# Patient Record
Sex: FEMALE | Race: WHITE | URBAN - METROPOLITAN AREA
[De-identification: names, ages, dates, MRNs, and addresses within clinical notes are randomized per-mention and may not be internally consistent; named-entity substitution may affect disease eponyms.]

---

## 2018-03-20 ENCOUNTER — TELEPHONE (OUTPATIENT)
Dept: INTERNAL MEDICINE CLINIC | Facility: CLINIC | Age: 43
End: 2018-03-20

## 2018-05-06 ENCOUNTER — CHARTING TRANS (OUTPATIENT)
Dept: OTHER | Age: 43
End: 2018-05-06

## 2018-05-06 ENCOUNTER — LAB SERVICES (OUTPATIENT)
Dept: OTHER | Age: 43
End: 2018-05-06

## 2018-05-10 LAB — RAPID STREP GROUP A: POSITIVE

## 2018-08-10 ENCOUNTER — APPOINTMENT (OUTPATIENT)
Dept: OTHER | Facility: HOSPITAL | Age: 43
End: 2018-08-10
Attending: PREVENTIVE MEDICINE

## 2018-08-13 ENCOUNTER — APPOINTMENT (OUTPATIENT)
Dept: OTHER | Facility: HOSPITAL | Age: 43
End: 2018-08-13
Attending: PREVENTIVE MEDICINE

## 2018-11-01 VITALS
SYSTOLIC BLOOD PRESSURE: 102 MMHG | BODY MASS INDEX: 25.69 KG/M2 | TEMPERATURE: 97.5 F | HEIGHT: 63 IN | WEIGHT: 145 LBS | DIASTOLIC BLOOD PRESSURE: 80 MMHG

## 2023-03-22 RX ORDER — GARLIC EXTRACT 500 MG
1 CAPSULE ORAL DAILY
COMMUNITY

## 2023-03-26 ENCOUNTER — LAB ENCOUNTER (OUTPATIENT)
Dept: LAB | Facility: HOSPITAL | Age: 48
End: 2023-03-26
Attending: OBSTETRICS & GYNECOLOGY
Payer: COMMERCIAL

## 2023-03-26 DIAGNOSIS — Z01.818 PRE-OP TESTING: ICD-10-CM

## 2023-03-26 LAB
ERYTHROCYTE [DISTWIDTH] IN BLOOD BY AUTOMATED COUNT: 14.3 %
HCT VFR BLD AUTO: 39.7 %
HGB BLD-MCNC: 12.8 G/DL
MCH RBC QN AUTO: 28.5 PG (ref 26–34)
MCHC RBC AUTO-ENTMCNC: 32.2 G/DL (ref 31–37)
MCV RBC AUTO: 88.4 FL
PLATELET # BLD AUTO: 199 10(3)UL (ref 150–450)
RBC # BLD AUTO: 4.49 X10(6)UL
WBC # BLD AUTO: 4.7 X10(3) UL (ref 4–11)

## 2023-03-26 PROCEDURE — 36415 COLL VENOUS BLD VENIPUNCTURE: CPT

## 2023-03-26 PROCEDURE — 85027 COMPLETE CBC AUTOMATED: CPT

## 2023-03-28 ENCOUNTER — ANESTHESIA (OUTPATIENT)
Dept: SURGERY | Facility: HOSPITAL | Age: 48
End: 2023-03-28
Payer: COMMERCIAL

## 2023-03-28 ENCOUNTER — HOSPITAL ENCOUNTER (OUTPATIENT)
Facility: HOSPITAL | Age: 48
Setting detail: HOSPITAL OUTPATIENT SURGERY
Discharge: HOME | End: 2023-03-28
Attending: OBSTETRICS & GYNECOLOGY | Admitting: OBSTETRICS & GYNECOLOGY
Payer: COMMERCIAL

## 2023-03-28 ENCOUNTER — ANESTHESIA EVENT (OUTPATIENT)
Dept: SURGERY | Facility: HOSPITAL | Age: 48
End: 2023-03-28
Payer: COMMERCIAL

## 2023-03-28 VITALS
HEIGHT: 63 IN | WEIGHT: 159.19 LBS | RESPIRATION RATE: 18 BRPM | BODY MASS INDEX: 28.21 KG/M2 | TEMPERATURE: 99 F | SYSTOLIC BLOOD PRESSURE: 118 MMHG | HEART RATE: 57 BPM | OXYGEN SATURATION: 97 % | DIASTOLIC BLOOD PRESSURE: 72 MMHG

## 2023-03-28 DIAGNOSIS — Z01.818 PRE-OP TESTING: Primary | ICD-10-CM

## 2023-03-28 LAB — B-HCG UR QL: NEGATIVE

## 2023-03-28 PROCEDURE — 0UDB8ZX EXTRACTION OF ENDOMETRIUM, VIA NATURAL OR ARTIFICIAL OPENING ENDOSCOPIC, DIAGNOSTIC: ICD-10-PCS | Performed by: OBSTETRICS & GYNECOLOGY

## 2023-03-28 PROCEDURE — 0U5B8ZZ DESTRUCTION OF ENDOMETRIUM, VIA NATURAL OR ARTIFICIAL OPENING ENDOSCOPIC: ICD-10-PCS | Performed by: OBSTETRICS & GYNECOLOGY

## 2023-03-28 PROCEDURE — 88305 TISSUE EXAM BY PATHOLOGIST: CPT | Performed by: OBSTETRICS & GYNECOLOGY

## 2023-03-28 PROCEDURE — 81025 URINE PREGNANCY TEST: CPT

## 2023-03-28 RX ORDER — HYDROMORPHONE HYDROCHLORIDE 1 MG/ML
0.2 INJECTION, SOLUTION INTRAMUSCULAR; INTRAVENOUS; SUBCUTANEOUS EVERY 5 MIN PRN
Status: DISCONTINUED | OUTPATIENT
Start: 2023-03-28 | End: 2023-03-28

## 2023-03-28 RX ORDER — SODIUM CHLORIDE, SODIUM LACTATE, POTASSIUM CHLORIDE, CALCIUM CHLORIDE 600; 310; 30; 20 MG/100ML; MG/100ML; MG/100ML; MG/100ML
INJECTION, SOLUTION INTRAVENOUS CONTINUOUS
Status: DISCONTINUED | OUTPATIENT
Start: 2023-03-28 | End: 2023-03-28

## 2023-03-28 RX ORDER — LIDOCAINE HYDROCHLORIDE 10 MG/ML
INJECTION, SOLUTION EPIDURAL; INFILTRATION; INTRACAUDAL; PERINEURAL AS NEEDED
Status: DISCONTINUED | OUTPATIENT
Start: 2023-03-28 | End: 2023-03-28 | Stop reason: SURG

## 2023-03-28 RX ORDER — KETOROLAC TROMETHAMINE 30 MG/ML
INJECTION, SOLUTION INTRAMUSCULAR; INTRAVENOUS AS NEEDED
Status: DISCONTINUED | OUTPATIENT
Start: 2023-03-28 | End: 2023-03-28 | Stop reason: SURG

## 2023-03-28 RX ORDER — HYDROMORPHONE HYDROCHLORIDE 1 MG/ML
0.6 INJECTION, SOLUTION INTRAMUSCULAR; INTRAVENOUS; SUBCUTANEOUS EVERY 5 MIN PRN
Status: DISCONTINUED | OUTPATIENT
Start: 2023-03-28 | End: 2023-03-28

## 2023-03-28 RX ORDER — MIDAZOLAM HYDROCHLORIDE 1 MG/ML
INJECTION INTRAMUSCULAR; INTRAVENOUS AS NEEDED
Status: DISCONTINUED | OUTPATIENT
Start: 2023-03-28 | End: 2023-03-28 | Stop reason: SURG

## 2023-03-28 RX ORDER — ACETAMINOPHEN 500 MG
1000 TABLET ORAL ONCE
Status: DISCONTINUED | OUTPATIENT
Start: 2023-03-28 | End: 2023-03-28 | Stop reason: HOSPADM

## 2023-03-28 RX ORDER — DEXAMETHASONE SODIUM PHOSPHATE 4 MG/ML
VIAL (ML) INJECTION AS NEEDED
Status: DISCONTINUED | OUTPATIENT
Start: 2023-03-28 | End: 2023-03-28 | Stop reason: SURG

## 2023-03-28 RX ORDER — HYDROMORPHONE HYDROCHLORIDE 1 MG/ML
0.4 INJECTION, SOLUTION INTRAMUSCULAR; INTRAVENOUS; SUBCUTANEOUS EVERY 5 MIN PRN
Status: DISCONTINUED | OUTPATIENT
Start: 2023-03-28 | End: 2023-03-28

## 2023-03-28 RX ORDER — HYDROCODONE BITARTRATE AND ACETAMINOPHEN 5; 325 MG/1; MG/1
1 TABLET ORAL ONCE AS NEEDED
Status: DISCONTINUED | OUTPATIENT
Start: 2023-03-28 | End: 2023-03-28

## 2023-03-28 RX ORDER — ACETAMINOPHEN 500 MG
1000 TABLET ORAL ONCE AS NEEDED
Status: DISCONTINUED | OUTPATIENT
Start: 2023-03-28 | End: 2023-03-28

## 2023-03-28 RX ORDER — HYDROCODONE BITARTRATE AND ACETAMINOPHEN 5; 325 MG/1; MG/1
2 TABLET ORAL ONCE AS NEEDED
Status: DISCONTINUED | OUTPATIENT
Start: 2023-03-28 | End: 2023-03-28

## 2023-03-28 RX ORDER — ONDANSETRON 2 MG/ML
4 INJECTION INTRAMUSCULAR; INTRAVENOUS EVERY 6 HOURS PRN
Status: DISCONTINUED | OUTPATIENT
Start: 2023-03-28 | End: 2023-03-28

## 2023-03-28 RX ORDER — KETAMINE HYDROCHLORIDE 50 MG/ML
INJECTION, SOLUTION, CONCENTRATE INTRAMUSCULAR; INTRAVENOUS AS NEEDED
Status: DISCONTINUED | OUTPATIENT
Start: 2023-03-28 | End: 2023-03-28 | Stop reason: SURG

## 2023-03-28 RX ORDER — ONDANSETRON 2 MG/ML
INJECTION INTRAMUSCULAR; INTRAVENOUS AS NEEDED
Status: DISCONTINUED | OUTPATIENT
Start: 2023-03-28 | End: 2023-03-28 | Stop reason: SURG

## 2023-03-28 RX ORDER — FLUCONAZOLE 150 MG/1
TABLET ORAL
COMMUNITY
Start: 2023-02-20

## 2023-03-28 RX ORDER — NALOXONE HYDROCHLORIDE 0.4 MG/ML
80 INJECTION, SOLUTION INTRAMUSCULAR; INTRAVENOUS; SUBCUTANEOUS AS NEEDED
Status: DISCONTINUED | OUTPATIENT
Start: 2023-03-28 | End: 2023-03-28

## 2023-03-28 RX ORDER — PROCHLORPERAZINE EDISYLATE 5 MG/ML
5 INJECTION INTRAMUSCULAR; INTRAVENOUS EVERY 8 HOURS PRN
Status: DISCONTINUED | OUTPATIENT
Start: 2023-03-28 | End: 2023-03-28

## 2023-03-28 RX ORDER — SCOLOPAMINE TRANSDERMAL SYSTEM 1 MG/1
1 PATCH, EXTENDED RELEASE TRANSDERMAL ONCE
Status: DISCONTINUED | OUTPATIENT
Start: 2023-03-28 | End: 2023-03-28 | Stop reason: HOSPADM

## 2023-03-28 RX ADMIN — DEXAMETHASONE SODIUM PHOSPHATE 4 MG: 4 MG/ML VIAL (ML) INJECTION at 17:35:00

## 2023-03-28 RX ADMIN — ONDANSETRON 4 MG: 2 INJECTION INTRAMUSCULAR; INTRAVENOUS at 17:44:00

## 2023-03-28 RX ADMIN — LIDOCAINE HYDROCHLORIDE 50 MG: 10 INJECTION, SOLUTION EPIDURAL; INFILTRATION; INTRACAUDAL; PERINEURAL at 17:14:00

## 2023-03-28 RX ADMIN — KETOROLAC TROMETHAMINE 30 MG: 30 INJECTION, SOLUTION INTRAMUSCULAR; INTRAVENOUS at 17:42:00

## 2023-03-28 RX ADMIN — SODIUM CHLORIDE, SODIUM LACTATE, POTASSIUM CHLORIDE, CALCIUM CHLORIDE: 600; 310; 30; 20 INJECTION, SOLUTION INTRAVENOUS at 17:09:00

## 2023-03-28 RX ADMIN — MIDAZOLAM HYDROCHLORIDE 2 MG: 1 INJECTION INTRAMUSCULAR; INTRAVENOUS at 17:11:00

## 2023-03-28 RX ADMIN — KETAMINE HYDROCHLORIDE 12.5 MG: 50 INJECTION, SOLUTION, CONCENTRATE INTRAMUSCULAR; INTRAVENOUS at 17:15:00

## 2023-03-28 NOTE — DISCHARGE INSTRUCTIONS
Home Care Instructions  DILATATION AND CURETTAGE (D&C)  Dr Chaya Sifuentes. Dr Lionel Soto    Definition  Dilation of the cervix (the opening of the uterus) and scraping of the endometrial lining of the uterus for diagnostic and/or therapeutic purposes. IMPORTANT POINTS TO KNOW   1. Minimal activity is preferred for two or three days (No heavy lifting,   straining or strenuous activity. 2. Showering is preferred to bathing for three to four days. 3. It is important to wipe from the front to the back after elimination. 4. No sexual intercourse, douching, or use of tampons for 14 days   5. Vaginal bleeding or spotting may continue for a week after the   procedure   6. Mild cramping may continue for two or three days after the procedure. A   mild analgesic, like Aleve or Advil, should relieve the cramping. NOTIFY THE OFFICE OF   1. Vaginal bleeding heavier than a menstrual period   2. Vaginal discharge that burns, irritate or has a foul odor   3. Chills or temperature of 100.8 degrees of higher   4. Severe lower abdominal cramps or pain   5. Frequency, urgency, and/or burning with urination. The above listed information is meant to be a guide only, if you have problems or questions not answered on this sheet, please contact our office. [facility]  [phone] received a drug called Toradol which is an Anti Inflammatory at: 5:42 PM  If you are allowed to take Anti inflammatories:    Do not take any Anti Inflammatory like Motrin, Aleve or Ibuprophen until after: 11:42 PM if needed.  Always follow all label directions   Please report any suspected allergic reactions or bleeding issues to your doctor

## 2023-03-28 NOTE — BRIEF OP NOTE
Pre-Operative Diagnosis: MENORRHAGIA     Post-Operative Diagnosis: MENORRHAGIA      Procedure Performed:   HYSTEROSCOPY, DILATION AND CURETTAGE WITH KENDALL ENDOMETRIAL ABLATION    Surgeon(s) and Role:     * Clif Medina MD - Primary     Surgical Findings: 4cm cervix, 10cm sound - Cavity at 6cm.      Specimen: endometrial curettings     Estimated Blood Loss: 50cc    Moi Nam MD  3/28/2023  5:52 PM

## 2023-03-29 NOTE — OPERATIVE REPORT
[facility]    PATIENT'S NAME: Tamar Ruiz   ATTENDING PHYSICIAN: Kamari Mcclelland M.D. OPERATING PHYSICIAN: Kamari Mcclelland M.D. PATIENT ACCOUNT#:   [de-identified]    LOCATION:  [facility] PRE [facility] 14 EDW 10  MEDICAL RECORD #:   [id number]       YOB: 1975  ADMISSION DATE:       03/28/2023      OPERATION DATE:  03/28/2023    OPERATIVE REPORT      PREOPERATIVE DIAGNOSIS:  Menorrhagia. POSTOPERATIVE DIAGNOSIS:  Menorrhagia. PROCEDURE:  Hysteroscopy, dilatation and curettage, Carolann endometrial ablation. ANESTHESIA:  MAC.    COMPLICATIONS:  None. ESTIMATED BLOOD LOSS:  50 mL. FLUIDS:  800 mL of LR with 200 mL deficit. URINE OUTPUT:  300 mL of clear urine prior to the procedure. OPERATIVE TECHNIQUE:  The patient was taken to the operating with IV fluids running. She was then placed under anesthesia without difficulty, placed in the dorsal lithotomy position, and prepped and draped in the usual sterile fashion. At this time, a surgical time-out was performed confirming patient and procedure. Next, the red rubber catheter was used to empty the bladder of approximately 300 mL of urine. Exam under anesthesia revealed an anteverted, slightly enlarged uterus. A weighted speculum was next placed, and the anterior lip of the cervix was grasped with a single-tooth tenaculum. The cervix was gently dilated to 5 mm, and the hysteroscope was introduced with excellent visualization of the endometrial cavity. Both ostia were visualized. There were no polyps or fibroids noted. There was just a thickened endometrium. At this time, the hysteroscope was removed, and the cervix was then dilated to 8 mm. Following this, the [place] Baars was activated. It was placed up to the endometrial cavity into the fundus, noted with a cervical length of 4 cm, uterine sound of 10 cm giving a cavity length of 6 cm. The Carolann device was placed to these settings.   Once it was placed inside the cavity, the balloon was expanded, and the paddle was pushed for the cavity integrity assessment. This was passed x2, and the Carolann device was activated. At 120 seconds of burn time, the procedure ended. The Carolann device was removed gently after bringing the array back in and taking out the air in the balloon. The device was gently removed from the patient's uterus through the cervix. Following this, the hysteroscope was reintroduced to evaluate the endometrial ablation. However, the patient did not tolerate this, and no pictures were taken. Following that, the tenaculum was removed. There was minimal bleeding noted at the tenaculum site. The patient was then awakened from anesthesia and taken to Recovery in stable condition. Endometrial curettings were the pathology.      Dictated By Marco A Sheridan M.D.  d: 03/28/2023 18:05:14  t: 03/28/2023 20:46:38  [facility] [id number]  QP/

## 2023-04-10 NOTE — ANESTHESIA POSTPROCEDURE EVALUATION
78 Jackie Mcintyre Patient Status:  Hospital Outpatient Surgery   Age/Gender 50year old female MRN [id number]   Location [address] [place] [address] Attending No att. providers found   Hosp Day # 0 PCP No primary care provider on file. Anesthesia Post-op Note    HYSTEROSCOPY, DILATION AND CURETTAGE WITH KENDALL ENDOMETRIAL ABLATION    Procedure Summary     Date: 03/28/23 Room / Location: [address] [place] [address] OR 18 / 1404 [facility] OR    Anesthesia Start: 7633 Anesthesia Stop: 7776    Procedure: HYSTEROSCOPY, DILATION AND CURETTAGE WITH KENDALL ENDOMETRIAL ABLATION (Vagina ) Diagnosis: (MENORRHAGIA)    Surgeons: Lazara Phillips MD Anesthesiologist: Macon Krabbe, MD    Anesthesia Type: MAC ASA Status: 1          Anesthesia Type: MAC    Vitals Value Taken Time   /72 03/28/23 1825   Temp 97.6 04/09/23 1904   Pulse 57 03/28/23 1830   Resp 18 03/28/23 1825   SpO2 97 % 03/28/23 1830       Patient Location: PACU    Anesthesia Type: general    Airway Patency: patent    Postop Pain Control: adequate    Mental Status: mildly sedated but able to meaningfully participate in the post-anesthesia evaluation    Nausea/Vomiting: none    Cardiopulmonary/Hydration status: stable euvolemic    Complications: no apparent anesthesia related complications    Postop vital signs: stable    Dental Exam: Unchanged from Preop    Patient to be discharged from PACU when criteria met.

## 2025-06-04 ENCOUNTER — APPOINTMENT (OUTPATIENT)
Dept: DERMATOLOGY | Age: 50
End: 2025-06-04

## 2025-06-04 DIAGNOSIS — L98.8 RHYTIDES: ICD-10-CM

## 2025-06-04 DIAGNOSIS — L82.1 MACULAR SEBORRHEIC KERATOSIS: ICD-10-CM

## 2025-06-04 DIAGNOSIS — Z12.83 SCREENING EXAM FOR SKIN CANCER: ICD-10-CM

## 2025-06-04 DIAGNOSIS — D18.01 CHERRY ANGIOMA: Primary | ICD-10-CM

## 2025-06-04 PROCEDURE — 99203 OFFICE O/P NEW LOW 30 MIN: CPT | Performed by: PHYSICIAN ASSISTANT

## 2025-06-04 RX ORDER — IBUPROFEN 200 MG
200 TABLET ORAL
COMMUNITY

## (undated) DEVICE — GYN CDS: Brand: MEDLINE INDUSTRIES, INC.

## (undated) DEVICE — SOLUTION  .9 3000ML

## (undated) DEVICE — TUBING CYSTO

## (undated) DEVICE — SLEEVE KENDALL SCD EXPRESS MED

## (undated) DEVICE — STERILE POLYISOPRENE POWDER-FREE SURGICAL GLOVES: Brand: PROTEXIS

## (undated) DEVICE — PREMIUM WET SKIN PREP TRAY: Brand: MEDLINE INDUSTRIES, INC.

## (undated) DEVICE — Device